# Patient Record
(demographics unavailable — no encounter records)

---

## 2024-10-31 NOTE — CONSULT LETTER
[Dear  ___] : Dear  [unfilled], [Consult Letter:] : I had the pleasure of evaluating your patient, [unfilled]. [( Thank you for referring [unfilled] for consultation for _____ )] : Thank you for referring [unfilled] for consultation for [unfilled] [Please see my note below.] : Please see my note below. [Consult Closing:] : Thank you very much for allowing me to participate in the care of this patient.  If you have any questions, please do not hesitate to contact me. [Sincerely,] : Sincerely, [FreeTextEntry3] : Dany Garcia MD  Gastroenterology [facility]

## 2024-10-31 NOTE — ASSESSMENT
[FreeTextEntry1] : Reduce pantoprazole to 20 mg daily If symptomatic on this lower dose,  then can increase back to 40 mg  Office follow-up in 6-month   I spent 22 minutes with the patient and her  and answered all their questions

## 2025-04-15 NOTE — CONSULT LETTER
[Dear  ___] : Dear  [unfilled], [Consult Letter:] : I had the pleasure of evaluating your patient, [unfilled]. [( Thank you for referring [unfilled] for consultation for _____ )] : Thank you for referring [unfilled] for consultation for [unfilled] [Please see my note below.] : Please see my note below. [Consult Closing:] : Thank you very much for allowing me to participate in the care of this patient.  If you have any questions, please do not hesitate to contact me. [Sincerely,] : Sincerely, [FreeTextEntry3] : Dany Garcia MD  Gastroenterology [facility]

## 2025-04-15 NOTE — ASSESSMENT
[FreeTextEntry1] : Today, pantoprazole both 20 mg and 40 mg as needed  Office follow-up in 6 months  Repeat colonoscopy in May 2029   I spent 23 minutes with the patient and answered all of her questions

## 2025-04-15 NOTE — HISTORY OF PRESENT ILLNESS
[FreeTextEntry1] : She was tapered down to 20 mg of pantoprazole but had recurrent breakthrough symptoms.  She increased to 40 mg.  She is presently on 40 mg daily with complete resolution of her symptoms.  She wants to try alternating 40 mg with 20 mg daily which would be good as this would have her have less medications.  Last colonoscopy was May 2024 for which 2 polyps were removed.  She has a family history of colon cancer specifically  her brother. Her next colonoscopy should be May 2029.   was in the room  Amaris was in the room for the entire office visit